# Patient Record
Sex: MALE | Race: WHITE | NOT HISPANIC OR LATINO | Employment: FULL TIME | URBAN - METROPOLITAN AREA
[De-identification: names, ages, dates, MRNs, and addresses within clinical notes are randomized per-mention and may not be internally consistent; named-entity substitution may affect disease eponyms.]

---

## 2022-02-25 ENCOUNTER — HOSPITAL ENCOUNTER (EMERGENCY)
Facility: HOSPITAL | Age: 47
Discharge: HOME | End: 2022-02-25
Attending: EMERGENCY MEDICINE

## 2022-02-25 VITALS
SYSTOLIC BLOOD PRESSURE: 137 MMHG | OXYGEN SATURATION: 99 % | BODY MASS INDEX: 22.29 KG/M2 | HEIGHT: 67 IN | WEIGHT: 142 LBS | DIASTOLIC BLOOD PRESSURE: 88 MMHG | HEART RATE: 62 BPM | TEMPERATURE: 98 F | RESPIRATION RATE: 16 BRPM

## 2022-02-25 DIAGNOSIS — L03.011 PARONYCHIA OF FINGER OF RIGHT HAND: Primary | ICD-10-CM

## 2022-02-25 PROCEDURE — 25000003 PHARM REV CODE 250: Mod: ER | Performed by: PHYSICIAN ASSISTANT

## 2022-02-25 PROCEDURE — 99284 EMERGENCY DEPT VISIT MOD MDM: CPT | Mod: 25,ER

## 2022-02-25 RX ORDER — SULFAMETHOXAZOLE AND TRIMETHOPRIM 800; 160 MG/1; MG/1
1 TABLET ORAL 2 TIMES DAILY
Qty: 14 TABLET | Refills: 0 | Status: SHIPPED | OUTPATIENT
Start: 2022-02-25 | End: 2022-03-04

## 2022-02-25 RX ORDER — NAPROXEN 250 MG/1
500 TABLET ORAL
Status: COMPLETED | OUTPATIENT
Start: 2022-02-25 | End: 2022-02-25

## 2022-02-25 RX ORDER — NAPROXEN 500 MG/1
500 TABLET ORAL 2 TIMES DAILY WITH MEALS
Qty: 12 TABLET | Refills: 0 | Status: SHIPPED | OUTPATIENT
Start: 2022-02-25

## 2022-02-25 RX ORDER — SULFAMETHOXAZOLE AND TRIMETHOPRIM 800; 160 MG/1; MG/1
1 TABLET ORAL
Status: COMPLETED | OUTPATIENT
Start: 2022-02-25 | End: 2022-02-25

## 2022-02-25 RX ADMIN — NAPROXEN 500 MG: 250 TABLET ORAL at 02:02

## 2022-02-25 RX ADMIN — SULFAMETHOXAZOLE AND TRIMETHOPRIM 1 TABLET: 800; 160 TABLET ORAL at 02:02

## 2022-02-25 NOTE — ED PROVIDER NOTES
"Encounter Date: 2/25/2022       History     Chief Complaint   Patient presents with    Finger Injury     Pain and swelling to right thumb, started the "other day"     46-year-old male presents emergency department for evaluation of 5 day history of redness and swelling to his right thumb.  Patient reports noticing a small amount of redness and swelling 5 days ago to the bottom part of his right thumbnail.  He reports that 3 days ago he poked the skin with a needle in order to drain out some of the swelling.  He reports that he was able to express small amount of blood and pus from the wound.  He reports that the redness increased over the last couple of days.  He denies any numbness, tingling, weakness or decreased range of motion in the hand.  He feels denies any associated symptoms including fever, headache, dizziness, neck pain, chest pain, shortness of breath, abdominal pain, nausea, vomiting, flank pain or dysuria.  He reports that he has been taking BC powder with minimal relief of symptoms.  He reports last dose of BC powder was taken  this morning.        Review of patient's allergies indicates:  No Known Allergies  History reviewed. No pertinent past medical history.  History reviewed. No pertinent surgical history.  History reviewed. No pertinent family history.  Social History     Tobacco Use    Smoking status: Never Smoker    Smokeless tobacco: Never Used   Substance Use Topics    Alcohol use: Never     Review of Systems   Constitutional: Negative for activity change, appetite change and fever.   HENT: Negative for congestion, rhinorrhea, sinus pressure and sore throat.    Eyes: Negative for photophobia and visual disturbance.   Respiratory: Negative for cough and shortness of breath.    Cardiovascular: Negative for chest pain.   Gastrointestinal: Negative for nausea.   Genitourinary: Negative for dysuria.   Musculoskeletal: Negative for back pain.   Skin: Positive for wound. Negative for rash. "   Neurological: Negative for dizziness, weakness and headaches.   Hematological: Does not bruise/bleed easily.       Physical Exam     Initial Vitals [02/25/22 1346]   BP Pulse Resp Temp SpO2   137/88 62 16 97.9 °F (36.6 °C) 99 %      MAP       --         Physical Exam    Nursing note and vitals reviewed.  Constitutional: He appears well-developed and well-nourished. He is not diaphoretic. No distress.   HENT:   Head: Normocephalic and atraumatic.   Right Ear: External ear normal.   Left Ear: External ear normal.   Mouth/Throat: Oropharynx is clear and moist. No oropharyngeal exudate.   Eyes: Conjunctivae and EOM are normal. Pupils are equal, round, and reactive to light.   Neck: Neck supple.   Normal range of motion.  Cardiovascular: Normal rate, regular rhythm and normal heart sounds.   Pulmonary/Chest: Breath sounds normal. No respiratory distress. He has no wheezes. He has no rhonchi. He has no rales. He exhibits no tenderness.   Musculoskeletal:      Right forearm: No tenderness or bony tenderness.      Right wrist: No tenderness, bony tenderness or snuff box tenderness. Normal range of motion.      Right hand: Swelling and tenderness present. No bony tenderness. Normal range of motion.        Arms:       Cervical back: Normal range of motion and neck supple.     Neurological: He is alert and oriented to person, place, and time.   Skin: Skin is warm and dry.   Psychiatric: He has a normal mood and affect.         ED Course   Procedures  Labs Reviewed - No data to display       Imaging Results          X-Ray Finger 2 or More Views Right (Final result)  Result time 02/25/22 14:22:34    Final result by ROSEY Burleson Sr., MD (02/25/22 14:22:34)                 Impression:      There is mild prominence of the soft tissue thickness around the interphalangeal joint of the thumb.  This is characteristic of a cellulitis or soft tissue contusion.      Electronically signed by: Jah Burleson  MD  Date:    02/25/2022  Time:    14:22             Narrative:    EXAMINATION:  XR FINGER 2 OR MORE VIEWS RIGHT    CLINICAL HISTORY:  finger pain;    COMPARISON:  None    FINDINGS:  There is no fracture. There is no dislocation.  There is mild prominence of the soft tissue thickness around the interphalangeal joint of the thumb.                                 Medications   sulfamethoxazole-trimethoprim 800-160mg per tablet 1 tablet (1 tablet Oral Given 2/25/22 1400)   naproxen tablet 500 mg (500 mg Oral Given 2/25/22 1400)     Medical Decision Making:   Initial Assessment:   46-year-old male presents emergency department for evaluation of right thumb pain, redness and swelling.  Physical exam reveals a nontoxic-appearing male in no acute distress.  Patient is afebrile vital signs within normal limits.  Neurological exam reveals alert and oriented patient.  Neck is supple, nontender to palpation.  Lungs clear to auscultation bilaterally.  Examination of the right upper extremity reveals Erythema and mild edema noted to the radial and inferior aspect of the nail bed extending into the dorsum of the finger proximally 1 cm.  No circumferential edema or erythema noted.  No erythema, induration or warmth noted at the IP joint.  No fluctuance noted.  No subungual pus noted.  Full range of motion, sensation and peripheral pulses intact in upper extremities bilaterally.  Capillary refill less than 3 seconds.  No felon noted.  Differential Diagnosis:   Paronychia  Early cellulitis  I carefully considered but doubt serious etiology including circumferential cellulitis, flexor tenosynovitis or intra-articular involvement.    ED Management:  X-ray report reveals mild predominance of the soft tissue thickness around the interphalangeal joint the thumb.  Characteristic of cellulitis or soft tissue contusion.  No fracture or dislocation.  These findings were discussed at length with the patient verbalizes understanding and  agreement course of treatment.  Instructed patient to follow up with his primary care provider for re-evaluation and to return to the emergency department immediately for any new or worsening symptoms.                      Clinical Impression:   Final diagnoses:  [L03.011] Paronychia of finger of right hand (Primary)          ED Disposition Condition    Discharge Stable        ED Prescriptions     Medication Sig Dispense Start Date End Date Auth. Provider    naproxen (NAPROSYN) 500 MG tablet Take 1 tablet (500 mg total) by mouth 2 (two) times daily with meals. 12 tablet 2/25/2022  Aliya López PA-C    sulfamethoxazole-trimethoprim 800-160mg (BACTRIM DS) 800-160 mg Tab Take 1 tablet by mouth 2 (two) times daily. for 7 days 14 tablet 2/25/2022 3/4/2022 Aliya López PA-C        Follow-up Information    None          Aliya López PA-C  02/25/22 9347

## 2022-02-25 NOTE — Clinical Note
"Zach NOGUERA" Karen was seen and treated in our emergency department on 2/25/2022.  He may return to work on 02/28/2022.  May return 2/28/2022 with no restrictions     If you have any questions or concerns, please don't hesitate to call.      Ricardo Verma RN    "

## 2022-03-31 ENCOUNTER — HOSPITAL ENCOUNTER (EMERGENCY)
Facility: HOSPITAL | Age: 47
Discharge: HOME | End: 2022-04-01
Attending: EMERGENCY MEDICINE

## 2022-03-31 DIAGNOSIS — T65.91XA INGESTION OF SUBSTANCE, ACCIDENTAL OR UNINTENTIONAL, INITIAL ENCOUNTER: Primary | ICD-10-CM

## 2022-03-31 DIAGNOSIS — R11.10 VOMITING: ICD-10-CM

## 2022-03-31 LAB — POCT GLUCOSE: 207 MG/DL (ref 70–110)

## 2022-03-31 PROCEDURE — 99284 EMERGENCY DEPT VISIT MOD MDM: CPT | Mod: 25

## 2022-03-31 PROCEDURE — 96374 THER/PROPH/DIAG INJ IV PUSH: CPT

## 2022-03-31 PROCEDURE — 63600175 PHARM REV CODE 636 W HCPCS: Performed by: INTERNAL MEDICINE

## 2022-03-31 PROCEDURE — 93010 EKG 12-LEAD: ICD-10-PCS | Mod: ,,, | Performed by: INTERNAL MEDICINE

## 2022-03-31 PROCEDURE — 99284 PR EMERGENCY DEPT VISIT,LEVEL IV: ICD-10-PCS | Mod: ,,, | Performed by: EMERGENCY MEDICINE

## 2022-03-31 PROCEDURE — 93005 ELECTROCARDIOGRAM TRACING: CPT

## 2022-03-31 PROCEDURE — 25000003 PHARM REV CODE 250: Performed by: INTERNAL MEDICINE

## 2022-03-31 PROCEDURE — 96376 TX/PRO/DX INJ SAME DRUG ADON: CPT

## 2022-03-31 PROCEDURE — 93010 ELECTROCARDIOGRAM REPORT: CPT | Mod: ,,, | Performed by: INTERNAL MEDICINE

## 2022-03-31 PROCEDURE — 99284 EMERGENCY DEPT VISIT MOD MDM: CPT | Mod: ,,, | Performed by: EMERGENCY MEDICINE

## 2022-03-31 PROCEDURE — 82962 GLUCOSE BLOOD TEST: CPT | Mod: 59

## 2022-03-31 RX ORDER — ONDANSETRON 2 MG/ML
4 INJECTION INTRAMUSCULAR; INTRAVENOUS
Status: COMPLETED | OUTPATIENT
Start: 2022-03-31 | End: 2022-03-31

## 2022-03-31 RX ADMIN — SODIUM CHLORIDE 1000 ML: 0.9 INJECTION, SOLUTION INTRAVENOUS at 11:03

## 2022-03-31 RX ADMIN — ONDANSETRON 4 MG: 2 INJECTION INTRAMUSCULAR; INTRAVENOUS at 11:03

## 2022-03-31 NOTE — Clinical Note
"Zach NOGUERA "Zach Jones was seen and treated in our emergency department on 3/31/2022.  He may return to work on 04/02/2022.  This verifies that Zach Jones was seen on 4/1/2022 at [facility]. He may return to full activities/ work 4/2/2022.      If you have any questions or concerns, please don't hesitate to call.      Sharmin Carpio RN RN    "

## 2022-04-01 VITALS
SYSTOLIC BLOOD PRESSURE: 123 MMHG | DIASTOLIC BLOOD PRESSURE: 65 MMHG | OXYGEN SATURATION: 96 % | HEART RATE: 70 BPM | RESPIRATION RATE: 16 BRPM | TEMPERATURE: 99 F

## 2022-04-01 PROCEDURE — 96361 HYDRATE IV INFUSION ADD-ON: CPT

## 2022-04-01 PROCEDURE — 63600175 PHARM REV CODE 636 W HCPCS: Performed by: INTERNAL MEDICINE

## 2022-04-01 RX ORDER — ONDANSETRON 2 MG/ML
4 INJECTION INTRAMUSCULAR; INTRAVENOUS
Status: COMPLETED | OUTPATIENT
Start: 2022-04-01 | End: 2022-04-01

## 2022-04-01 RX ADMIN — ONDANSETRON 4 MG: 2 INJECTION INTRAMUSCULAR; INTRAVENOUS at 12:04

## 2022-04-01 NOTE — DISCHARGE INSTRUCTIONS
Diagnosis:   1. Ingestion of substance, accidental or unintentional, initial encounter    2. Vomiting        Tests you had showed:   Labs Reviewed   POCT GLUCOSE - Abnormal; Notable for the following components:       Result Value    POCT Glucose 207 (*)     All other components within normal limits   POCT GLUCOSE MONITORING CONTINUOUS      No orders to display       Treatments you received were:   Medications   sodium chloride 0.9% bolus 1,000 mL (0 mLs Intravenous Stopped 4/1/22 0043)   ondansetron injection 4 mg (4 mg Intravenous Given 3/31/22 4473)   ondansetron injection 4 mg (4 mg Intravenous Given 4/1/22 0043)       Home Care Instructions:  - Medications: Continue taking your home medications as prescribed    Follow-Up Plan:  - Follow-up with: Primary care doctor within 1-2  days  - Additional testing and/or evaluation will be directed by your primary doctor    Return to the Emergency Department for symptoms including but not limited to: worsening symptoms, severe back pain, shortness of breath or chest pain, vomiting with inability to hold down fluids, blood in vomit or poop, fevers greater than 100.4°F, passing out/fainting/unconsciousness, or other concerning symptoms.     No future appointments.

## 2022-04-01 NOTE — ED PROVIDER NOTES
"Encounter date: 11:19 PM 04/01/2022    Source of History   Patient and EMS    Chief Complaint   Pt presents with:   Ingestion (Pt reports taking 60 mg of delta 9 THC and CBD "space gummy". Pt attempted began to "feel funny"so attempted to drink a quart of milk before calling EMS. Pt lactose intolerant and actively vomiting. )      History Of Present Illness   Zach Jnoes is a 46 y.o. male with no reported past medical history who presents to the ED with vomiting.  Story provided by patient.  Patient states he was in normal status of health today.  He took a 60 mg THC gummy.  He states that he started to feel weird yet cannot describe what he was feeling.  He told his wife who instructed him to drink of qt of milk.  After he started drinking the milk he had multiple bouts of nonbloody nonbilious emesis.  He describes a generalized abdominal discomfort yet states he has no other complaints.  He states that he has never smoked marijuana her ate a gummy or marijuana edible .  He denies any chest pain shortness of breath.  He denies any recent illnesses.  He specifically denies fever, chest pain, shortness of breath, dysuria, pyuria or any other complaints at this time.  He states that he is lactose intolerant and usually does not drink milk.      This is the extent to the patients complaints today here in the emergency department.    Review Of Systems   As per HPI and below:  Positive for:  Vomiting  Constitutional: No fever.   HEENT: No voice change.   Eyes:  No visual disturbances. No eye pain.   Respiratory:  No shortness of breath. No wheezing. No cough.   Cardio:  No chest pain. No leg swelling. No palpitations.   GI:  No abdominal pain. + nausea and vomiting. No diarrhea.   :  No blood in urine. No difficulty urinating.   MSK:  No back pain. No neck pain.   Skin: No rash.   Neurologic: No headache. No dizziness.       Review of patient's allergies indicates:  No Known Allergies    No current " facility-administered medications on file prior to encounter.     Current Outpatient Medications on File Prior to Encounter   Medication Sig Dispense Refill    naproxen (NAPROSYN) 500 MG tablet Take 1 tablet (500 mg total) by mouth 2 (two) times daily with meals. 12 tablet 0       Past History   As per HPI and below:  No past medical history on file.  No past surgical history on file.    Social History     Socioeconomic History    Marital status: Single   Tobacco Use    Smoking status: Never Smoker    Smokeless tobacco: Never Used   Substance and Sexual Activity    Alcohol use: Never       No family history on file.    Physical Exam     Vitals:    03/31/22 2252 03/31/22 2256 04/01/22 0143 04/01/22 0301   BP: 126/88 127/69 (!) 146/89 123/65   Pulse: (!) 122 106 60 70   Resp: (!) 24 20 18 16   Temp:  98.6 °F (37 °C)     TempSrc:  Axillary     SpO2: 99% 99% 96% 96%     Physical Exam:   Nursing note and vitals reviewed.  Appearance:  Nontoxic adult male in no acute respiratory distress with vomitus on his shirt.  Making purposeful movements.  Speaking in full sentences.  Skin: No rashes seen.  Good turgor.  No abrasions.  No ecchymoses.  Eyes: No conjunctival injection. EOMI, PERRL.  Head: NC/AT  ENT: Oropharynx clear.    Chest: CTAB. No increased work of breathing, bilateral chest rise.  Cardiovascular: Regular rate and rhythm.  Normal equal bilateral radial pulses.  Abdomen: Soft.  Not distended.  Nontender.  No guarding.  No rebound. No Masses  Musculoskeletal: Good range of motion all joints.  No deformities.  Neck supple, full range of motion, no obvious deformity.  Neurologic: Moves all extremities.  Normal sensation.  No facial droop.  Normal speech.    Mental Status:  Alert and oriented x 3.  Appropriate, conversant.      Results and Medications    Procedures    Labs Reviewed   POCT GLUCOSE - Abnormal; Notable for the following components:       Result Value    POCT Glucose 207 (*)     All other components  within normal limits   POCT GLUCOSE MONITORING CONTINUOUS       Imaging Results    None             Medications   sodium chloride 0.9% bolus 1,000 mL (0 mLs Intravenous Stopped 4/1/22 0043)   ondansetron injection 4 mg (4 mg Intravenous Given 3/31/22 7996)   ondansetron injection 4 mg (4 mg Intravenous Given 4/1/22 0043)       MDM, Impression and Plan   Previous Records:  I decided to obtain old medical records.     Initial Assessment:   Urgent evaluation of 46 y.o. male with chief complaint of vomiting after taking a THC gummy an checking a court of milk.  On arrival to the ED he is noted to be afebrile, tachycardic with stable blood pressure in no acute respiratory distress.  Will obtain EKG prior initiating antiemetics due to concern for QT prolongation.   Differential Diagnosis:   -THC intoxication  -hypoglycemia  -unlikely infectious etiology based off history, physical exam  -unlikely intra-abdominal pathology based off physical exam and history  Independently Interpreted Test(s):   EKG:  I independently reviewed and interpreted the EKG and my findings are as follows:   Please see ED course.  EKG shows normal sinus rhythm with ventricular rate of 76 beats per minute.  Narrow QRS.  No ST segment elevations depressions.  No STEMI.      Clinical Tests:   Lab Tests: Ordered and Reviewed  Radiological Study: Ordered and Reviewed  Medical Tests: Ordered and Reviewed    ED Management:    Glucose not consistent with hypoglycemia.  Patient's tachycardia resolved with a L of fluids and his nausea and vomiting subsided with 8 mg of Zofran.  Patient was able to ambulate without difficulty.  He also p.o. challenged successfully.  On repeat assessment he states that his symptoms are resolving any believes this is due to this THC gummy that he had.  I spoke with the patient's girlfriend who states that she also took 1 of these gummies and her symptoms have resolved.  I told him to abstain from taking THC containing products.   They both state that they are comfortable going home.  Strict return precautions were discussed.  He voiced verbal understanding agreement the plan.  Patient deemed stable for discharge.  Please see ED course for discussion of labs.              Final diagnoses:  [R11.10] Vomiting  [T65.91XA] Ingestion of substance, accidental or unintentional, initial encounter (Primary)          ED Disposition Condition    Discharge Stable        ED Prescriptions     None        Follow-up Information     Follow up With Specialties Details Why Contact Info    [facility] Emergency Medicine  If symptoms worsen [address]  [facility] [id number] [phone]    [facility]  Schedule an appointment as soon as possible for a visit in 2 days or the primary care doctor of your choice [facility] [facility] [address]  [phone]            ED Course as of 04/01/22 0411 Fri Apr 01, 2022   0019 POCT Glucose(!): 207 [GM]      ED Course User Index  [GM] MD Yusuf Dia MD   Emergency Resident      Yusuf Ding MD  Resident  04/01/22 0411